# Patient Record
Sex: MALE | Race: WHITE | NOT HISPANIC OR LATINO | URBAN - METROPOLITAN AREA
[De-identification: names, ages, dates, MRNs, and addresses within clinical notes are randomized per-mention and may not be internally consistent; named-entity substitution may affect disease eponyms.]

---

## 2017-09-25 ENCOUNTER — ALLSCRIPTS OFFICE VISIT (OUTPATIENT)
Dept: OTHER | Facility: OTHER | Age: 22
End: 2017-09-25

## 2018-01-13 VITALS
DIASTOLIC BLOOD PRESSURE: 80 MMHG | SYSTOLIC BLOOD PRESSURE: 110 MMHG | HEIGHT: 67 IN | RESPIRATION RATE: 14 BRPM | BODY MASS INDEX: 32.33 KG/M2 | WEIGHT: 206 LBS | HEART RATE: 68 BPM

## 2021-12-28 ENCOUNTER — TELEMEDICINE (OUTPATIENT)
Dept: FAMILY MEDICINE CLINIC | Facility: CLINIC | Age: 26
End: 2021-12-28
Payer: COMMERCIAL

## 2021-12-28 DIAGNOSIS — R09.81 NASAL CONGESTION: ICD-10-CM

## 2021-12-28 DIAGNOSIS — R05.9 COUGH: ICD-10-CM

## 2021-12-28 DIAGNOSIS — R53.83 FATIGUE, UNSPECIFIED TYPE: Primary | ICD-10-CM

## 2021-12-28 DIAGNOSIS — U07.1 COVID-19: ICD-10-CM

## 2021-12-28 DIAGNOSIS — R07.89 CHEST TIGHTNESS: ICD-10-CM

## 2021-12-28 PROCEDURE — 99213 OFFICE O/P EST LOW 20 MIN: CPT | Performed by: FAMILY MEDICINE

## 2022-01-03 ENCOUNTER — TELEMEDICINE (OUTPATIENT)
Dept: FAMILY MEDICINE CLINIC | Facility: CLINIC | Age: 27
End: 2022-01-03
Payer: COMMERCIAL

## 2022-01-03 VITALS — HEIGHT: 67 IN | BODY MASS INDEX: 36.73 KG/M2 | WEIGHT: 234 LBS

## 2022-01-03 DIAGNOSIS — R07.89 CHEST TIGHTNESS: ICD-10-CM

## 2022-01-03 DIAGNOSIS — R53.83 FATIGUE, UNSPECIFIED TYPE: Primary | ICD-10-CM

## 2022-01-03 DIAGNOSIS — R09.81 NASAL CONGESTION: ICD-10-CM

## 2022-01-03 DIAGNOSIS — U07.1 COVID-19: ICD-10-CM

## 2022-01-03 DIAGNOSIS — R06.2 WHEEZING: ICD-10-CM

## 2022-01-03 DIAGNOSIS — R05.9 COUGH: ICD-10-CM

## 2022-01-03 PROCEDURE — 3725F SCREEN DEPRESSION PERFORMED: CPT | Performed by: FAMILY MEDICINE

## 2022-01-03 PROCEDURE — 3008F BODY MASS INDEX DOCD: CPT | Performed by: FAMILY MEDICINE

## 2022-01-03 PROCEDURE — 99213 OFFICE O/P EST LOW 20 MIN: CPT | Performed by: FAMILY MEDICINE

## 2022-01-03 RX ORDER — ALBUTEROL SULFATE 90 UG/1
2 AEROSOL, METERED RESPIRATORY (INHALATION) EVERY 6 HOURS PRN
Qty: 18 G | Refills: 0 | Status: SHIPPED | OUTPATIENT
Start: 2022-01-03

## 2022-01-03 NOTE — PROGRESS NOTES
COVID-19 Outpatient Progress Note    Disposition:     Patient has COVID-19 infection  Based off CDC guidelines, they were recommended to isolate for 5 days from the date of the positive test  If they remain asymptomatic, isolation may be ended followed by 5 days of wearing a mask when around othes to minimize risk of infecting others  If they have a fever, continue to stay home until fever resolves for at least 24 hours  Patient to quarantine for a total of 5 days  Since it has been longer than this, patient to continue wearing mask in house to protect unaffected individuals  He can use the albuterol inhaler ordered for patient since he has a history of asthma as a child  Likely having some exacerbation or reactive airway symptoms  He is to take albuterol every 6y hours while awake for first 2 hours and then transition to albuterol PRN for wheezing  I have spent 9 minutes directly with the patient  This virtual check-in was done via [employer] and patient was informed that this is a secure, HIPAA-compliant platform  He agrees to proceed  Patient agrees to participate in a virtual check in via telephone or video visit instead of presenting to the office to address urgent/immediate medical needs  Patient is aware this is a billable service  After connecting through [facility], the patient was identified by name and date of birth  Michael Lozada was informed that this was a telemedicine visit and that the exam was being conducted confidentially over secure lines  My office door was closed  No one else was in the room  Michael Lozada acknowledged consent and understanding of privacy and security of the telemedicine visit  I informed the patient that I have reviewed his record in Epic and presented the opportunity for him to ask any questions regarding the visit today  The patient agreed to participate      Verification of patient location:  Patient is located in the following state in which I hold an active license: [place]    Subjective:   Zachery Zuniga is a 31 y o  male who has been screened for COVID-19  Patient's symptoms include fatigue, nasal congestion, rhinorrhea, sore throat (mild), cough, chest tightness (mild, intermittent) and diarrhea (saturday last day)  Patient denies fever, chills, malaise, anosmia, loss of taste, shortness of breath, abdominal pain, nausea, vomiting, myalgias and headaches  Date of symptom onset: 12/25/2021  Date of exposure: 12/23/2021  COVID-19 vaccination status: Fully vaccinated (primary series)    Ester Jackson has been staying home and has isolated themselves in his home  He is taking care to not share personal items and is cleaning all surfaces that are touched often, like counters, tabletops, and doorknobs using household cleaning sprays or wipes  He is wearing a mask when he leaves his room  Patient has been steadily getting better  The patient denies worsening symptoms and continues to have mild rhinorrhea and nasal congestion  He is having some mild wheezing  Lab Results   Component Value Date    SARSCOV2 Negative 11/07/2020     No past medical history on file  No past surgical history on file  No current outpatient medications on file  No current facility-administered medications for this visit  No Known Allergies    Review of Systems   Constitutional: Positive for fatigue  Negative for chills and fever  HENT: Positive for congestion, rhinorrhea and sore throat (mild)  Respiratory: Positive for cough and chest tightness (mild, intermittent)  Negative for shortness of breath  Gastrointestinal: Positive for diarrhea (saturday last day)  Negative for abdominal pain, nausea and vomiting  Musculoskeletal: Negative for myalgias  Neurological: Negative for headaches       Objective:    Vitals:    01/03/22 0919   Weight: 106 kg (234 lb)   Height: 5' 7" (1 702 m)       Physical Exam  No apparent distress  No respiratory distress, increased respiratory rate  No retractions and Speaking in full sentences  Good color in face and cheeks  Nasal voice and nasal congestion noted  No flushing of face  Mild wheeze noted, dry cough intermittent  In good spirits/ mood  Gary Tatum was seen today for follow-up, covid-19 and covid-19  Diagnoses and all orders for this visit:    Fatigue, unspecified type  COVID-19  Nasal congestion  Cough  Chest tightness  Wheezing  Patient is having improvement in symptoms of COVID  He may have had an exacerbation of his childhood asthma since he does have an increase wheeze, chest tightness, and dry cough  Will have him use albuterol every 6 hours for first 24 hours and then Q6 PRN  He is to call back with continued symptoms in 3-4 days  Call back with worsening symptoms  -     albuterol (Proventil HFA) 90 mcg/act inhaler; Inhale 2 puffs every 6 (six) hours as needed for wheezing        VIRTUAL VISIT DISCLAIMER    Christos Pio verbally agrees to participate in [employer]  Pt is aware that [employer] could be limited without vital signs or the ability to perform a full hands-on physical exam  Andrea Miller understands he or the provider may request at any time to terminate the video visit and request the patient to seek care or treatment in person

## 2023-08-02 NOTE — PROGRESS NOTES
Assessment    1  Encounter for preventive health examination (V70 0) (Z00 00)    Plan  Health Maintenance    · Vitamins can help you get daily requirements that your diet may not be giving you ;  Status:Complete;   Done: [id number]   · We encourage all of our patients to exercise regularly  30 minutes of exercise or physical  activity five or more days a week is recommended for children and adults ;  Status:Complete;   Done: [id number]   · We recommend routine visits to a dentist ; Status:Complete;   Done: [id number]   · We recommend that you follow the "Mediterranean diet "; Status:Complete;   Done:  [id number]   · Follow-up visit in 1 year Evaluation and Treatment  Follow-up  Status: Complete  Done:  [id number]  Need for influenza vaccination    · Fluzone Quadrivalent Intramuscular Suspension    Discussion/Summary  Impression: health maintenance visit  Currently, he eats a healthy diet and has an adequate exercise regimen  Prostate cancer screening: the risks and benefits of prostate cancer screening were discussed and PSA is not indicated  Testicular cancer screening: the risks and benefits of testicular cancer screening were discussed and monthly self testicular exam was advised  Colorectal cancer screening: colorectal cancer screening is not indicated  The risks and benefits of immunizations were discussed and immunizations will be given as outlined in the orders  Possible side effects of new medications were reviewed with the patient/guardian today  The treatment plan was reviewed with the patient/guardian  The patient/guardian understands and agrees with the treatment plan      Chief Complaint  Preventative      History of Present Illness  HM, Adult Male: The patient is being seen for a health maintenance evaluation  The last health maintenance visit was year(s) ago  Social History: He is Single  Work status: working full time and occupation:   The patient has never smoked cigarettes   He reports occasional alcohol use  He has never used illicit drugs  General Health: The patient's health since the last visit is described as good  He has regular dental visits  He denies vision problems  Lifestyle:  He consumes a diverse and healthy diet  He does not have any weight concerns  He exercises regularly  He does not use tobacco  He consumes alcohol  He reports occasional alcohol use  Screening: Prostate cancer screening includes no previous evaluation  Testicular cancer screening includes monthly self testicular examinations  Colorectal cancer screening includes no previous screening  metabolic screening reviewed and current  Review of Systems    Constitutional: as noted in HPI  Eyes: No complaints of eye pain, no red eyes, no discharge from eyes, no itchy eyes  ENT: no complaints of earache, no hearing loss, no nosebleeds, no nasal discharge, no sore throat, no hoarseness  Cardiovascular: No complaints of slow heart rate, no fast heart rate, no chest pain, no palpitations, no leg claudication, no lower extremity  Respiratory: No complaints of shortness of breath, no wheezing, no cough, no SOB on exertion, no orthopnea or PND  Gastrointestinal: No complaints of abdominal pain, no constipation, no nausea or vomiting, no diarrhea or bloody stools  Genitourinary: No complaints of dysuria, no incontinence, no hesitancy, no nocturia, no genital lesion, no testicular pain  Musculoskeletal: No complaints of arthralgia, no myalgias, no joint swelling or stiffness, no limb pain or swelling  Integumentary: No complaints of skin rash or skin lesions, no itching, no skin wound, no dry skin  Neurological: No compliants of headache, no confusion, no convulsions, no numbness or tingling, no dizziness or fainting, no limb weakness, no difficulty walking  Psychiatric: Is not suicidal, no sleep disturbances, no anxiety or depression, no change in personality, no emotional problems  Endocrine: No complaints of proptosis, no hot flashes, no muscle weakness, no erectile dysfunction, no deepening of the voice, no feelings of weakness  Hematologic/Lymphatic: No complaints of swollen glands, no swollen glands in the neck, does not bleed easily, no easy bruising  Active Problems    1  Allergic rhinitis due to pollen (477 0) (J30 1)    Past Medical History    · History of Blood pressure elevated (401 9) (I10)    Family History  Mother    · Family history of hypertension (V17 49) (Z82 49)    Social History    · Never a smoker    Current Meds   1  No Reported Medications Recorded    Allergies    1  No Known Drug Allergies    Vitals   Recorded: [id number] 10:40AM   Heart Rate 68   Respiration 14   Systolic 657   Diastolic 80   Height 5 ft 6 5 in   Weight 206 lb    BMI Calculated 32 75   BSA Calculated 2 04     Physical Exam    Constitutional   General appearance: No acute distress, well appearing and well nourished  Ears, Nose, Mouth, and Throat   Otoscopic examination: Tympanic membranes translucent with normal light reflex  Canals patent without erythema  Oropharynx: Normal with no erythema, edema, exudate or lesions  Neck   Neck: Supple, symmetric, trachea midline, no masses  Pulmonary   Respiratory effort: No increased work of breathing or signs of respiratory distress  Auscultation of lungs: Clear to auscultation  Cardiovascular   Auscultation of heart: Normal rate and rhythm, normal S1 and S2, no murmurs  Examination of extremities for edema and/or varicosities: Normal     Abdomen   Abdomen: Non-tender, no masses  Liver and spleen: No hepatomegaly or splenomegaly  Lymphatic   Palpation of lymph nodes in neck: No lymphadenopathy  Palpation of lymph nodes in axillae: No lymphadenopathy  Musculoskeletal   Gait and station: Normal     Range of motion: Normal     Muscle strength/tone: Normal     Neurologic   Cortical function: Normal mental status  Coordination: Normal finger to nose and heel to shin  Psychiatric   Orientation to person, place and time: Normal     Mood and affect: Normal        Results/Data  PHQ-2 Adult Depression Screening [id number] 10:45AM User, Ahs     Test Name Result Flag Reference   PHQ-2 Adult Depression Score 0     Over the last two weeks, how often have you been bothered by any of the following problems?   Little interest or pleasure in doing things: Not at all - 0  Feeling down, depressed, or hopeless: Not at all - 0   PHQ-2 Adult Depression Screening Negative         Procedure    Procedure:   Results: 20/20 in both eyes with corrective device, 20/20 in the right eye with corrective device, 20/20 in the left eye with corrective device      Signatures   Electronically signed by : Cinda Mendoza MD; Sep 25 2017 12:19PM EST                       (Author) 02-Aug-2023